# Patient Record
Sex: MALE | Race: WHITE | NOT HISPANIC OR LATINO | Employment: PART TIME | URBAN - METROPOLITAN AREA
[De-identification: names, ages, dates, MRNs, and addresses within clinical notes are randomized per-mention and may not be internally consistent; named-entity substitution may affect disease eponyms.]

---

## 2020-01-04 ENCOUNTER — APPOINTMENT (OUTPATIENT)
Dept: GENERAL RADIOLOGY | Facility: HOSPITAL | Age: 25
End: 2020-01-04

## 2020-01-04 PROCEDURE — 70160 X-RAY EXAM OF NASAL BONES: CPT | Performed by: FAMILY MEDICINE

## 2022-04-08 ENCOUNTER — TELEPHONE (OUTPATIENT)
Dept: FAMILY MEDICINE CLINIC | Facility: CLINIC | Age: 27
End: 2022-04-08

## 2022-04-08 NOTE — TELEPHONE ENCOUNTER
Caller: Lizandro Newsome    Relationship to patient: Self    Best call back number: [phone]    Chief complaint: NEW PATIENT,HRT    Type of visit: NEW PATIENT    Requested date: EARLY APPOINTMENT    If rescheduling, when is the original appointment: 8/23    Additional notes: PATIENT CALLED IN TO SCHEDULE A NEW PATIENT APPOINTMENT WITH , HUB SCHEDULED FOR 8/23 BUT PATIENT WOULD LIKE TO SEE IF THERE ARE ANY EARLIER APPOINTMENTS. PATIENT NEEDS TO BE SEEN SOONER FOR HORMONE REPLACEMENT THERAPY.    PATIENT WOULD LIKE TO ADVISE  THIS IS A TRANS-HEALTH APPOINTMENT

## 2022-04-25 ENCOUNTER — OFFICE VISIT (OUTPATIENT)
Dept: FAMILY MEDICINE CLINIC | Facility: CLINIC | Age: 27
End: 2022-04-25

## 2022-04-25 VITALS
HEART RATE: 104 BPM | RESPIRATION RATE: 19 BRPM | OXYGEN SATURATION: 98 % | SYSTOLIC BLOOD PRESSURE: 115 MMHG | BODY MASS INDEX: 20.04 KG/M2 | HEIGHT: 70 IN | WEIGHT: 140 LBS | DIASTOLIC BLOOD PRESSURE: 70 MMHG

## 2022-04-25 DIAGNOSIS — F64.0 GENDER DYSPHORIA IN ADULT: Primary | ICD-10-CM

## 2022-04-25 DIAGNOSIS — E34.9 HORMONE IMBALANCE: ICD-10-CM

## 2022-04-25 PROBLEM — K21.9 GASTROESOPHAGEAL REFLUX DISEASE WITHOUT ESOPHAGITIS: Status: ACTIVE | Noted: 2017-09-13

## 2022-04-25 PROCEDURE — 99213 OFFICE O/P EST LOW 20 MIN: CPT | Performed by: FAMILY MEDICINE

## 2022-04-25 RX ORDER — ESTRADIOL VALERATE 20 MG/ML
5 INJECTION INTRAMUSCULAR WEEKLY
Qty: 5 ML | Refills: 3 | Status: SHIPPED | OUTPATIENT
Start: 2022-04-25 | End: 2022-06-14

## 2022-04-25 NOTE — PATIENT INSTRUCTIONS
[phone]    Transfemme Resources    [facility] Transgender Support Group - Facebook group    [employer] Association for Transgender Health, Standards of Care  https://www.wpath.org/publications/soc - pages 38 and 40 especially     [facility], Transgender Health - [email or web address]      Deborah Jimenez - [email or web address]    [facility] - [email or web address]     Parents, Families, and Friends of Lesbians and [place] - https://www.pflag.org/ourtranslovedones  - good document for friends/family who need to learn the basics     [facility] [place] - https://transequality.org/documents  - helps with name change, gender marker change, etc, is state specific     * * * * * *    Insurance will typically cover hormones, but if they don't, or if you need to pay out of pocket, use the following site.    www.[employer]     1cc, Luer-Roxi Tip  Syringes      18 G x 1.5” hypodermic needles for drawing up      25 G x 5/8” hypodermic needles for injecting      [employer] Trans Health Injection Guide -   https://[employer].org/wp-content/uploads/MG-6_TransHealth_InjectionGuide.pdf     Injection supplies:  Syringes   Needles   Alcohol swabs  Cotton balls/band-aids  Sharps container

## 2022-04-25 NOTE — PROGRESS NOTES
"Chief Complaint  Establish Care and Gender affirming hormone therapy     Subjective    History of Present Illness {CC  Problem List  Visit  Diagnosis   Encounters  Notes  Medications  Labs  Result Review Imaging  Media :23}     Lizandro Newsome presents to [facility] for Establish Care and Gender affirming hormone therapy .  History of Present Illness     Here today to establish care and discuss gender-affirming hormone therapy. Has been wrestling with gender identity during the pandemic. Began questioning their own gender and came out as bisexual and non-binary. Started presenting more feminine and found that it was pleasing. Feels that over the past couple years has become more comfortable as female, and more so less comfortable being thought of as male. Has done some general reading about hormonal transition, is interested in starting today.    Diagnosed with ADHD and some anxiety and depression. Sees a psychiatric provider and is currently on Adderall, Vyvanse, and some mirtazapine. Reports good adherence and tolerance to current regimen.    Objective     Vital Signs:   /70   Pulse 104   Resp 19   Ht 177.8 cm (70\")   Wt 63.5 kg (140 lb)   SpO2 98%   BMI 20.09 kg/m²   Physical Exam  Vitals and nursing note reviewed.   Constitutional:       General: He is not in acute distress.     Appearance: Normal appearance. He is not ill-appearing.   Cardiovascular:      Rate and Rhythm: Normal rate and regular rhythm.      Pulses: Normal pulses.      Heart sounds: Normal heart sounds. No murmur heard.  Pulmonary:      Effort: Pulmonary effort is normal. No respiratory distress.      Breath sounds: Normal breath sounds. No rales.   Neurological:      Mental Status: He is alert and oriented to person, place, and time. Mental status is at baseline.   Psychiatric:         Mood and Affect: Mood normal.         Behavior: Behavior normal.          Result Review  Data Reviewed:{ Labs  " Result Review  Imaging  Med Tab  Media :23}                   Assessment and Plan {CC Problem List  Visit Diagnosis  ROS  Review (Popup)  Health Maintenance  Quality  BestPractice  Medications  SmartSets  SnapShot Encounters  Media :23}   Diagnoses and all orders for this visit:    1. Gender dysphoria in adult (Primary)  -     estradiol valerate (DELESTROGEN) 20 MG/ML injection; Inject 0.25 mL into the appropriate muscle as directed by prescriber 1 (One) Time Per Week.  Dispense: 5 mL; Refill: 3    2. Hormone imbalance  -     estradiol valerate (DELESTROGEN) 20 MG/ML injection; Inject 0.25 mL into the appropriate muscle as directed by prescriber 1 (One) Time Per Week.  Dispense: 5 mL; Refill: 3    Established gender dysphoria, hormonal imbalance in a transgender patient with a strong desire for gender affirming hormone therapy.     Gender identity is consistent, persistent, and insistent. Discussed risks, benefits, alternatives, potential side effects of therapy, and typical follow up. Resources provided including [facility] Standards of Care, PFLAG “Our Trans Loved Ones”, and local support groups.     Meds as above. Gave detailed instructions as to proper subcutaneous injection technique and demonstrated the same. Given a bag of sample injection supplies to last until they are able to obtain their own supply. F/u with a phone call/message in 2-3 weeks, and an office visit in 2 months. Will order labs at f/u. Encouraged to sign up for and use [employer].    >50% of this 30 min visit spent in counseling about gender-affirming hormone therapy.      Follow Up {Instructions Charge Capture  Follow-up Communications :23}     Patient was given instructions and counseling regarding his condition or for health maintenance advice. Please see specific information pulled into the AVS (placed there by myself) if appropriate.    Return in about 3 months (around 7/25/2022), or if symptoms worsen or fail to improve, for  F/u gender-affirming hormone therapy.      NAN Montiel MD

## 2022-04-28 RX ORDER — ESTRADIOL VALERATE 40 MG/ML
5 INJECTION INTRAMUSCULAR WEEKLY
Qty: 5 ML | Refills: 2 | Status: SHIPPED | OUTPATIENT
Start: 2022-04-28

## 2022-04-29 ENCOUNTER — PATIENT ROUNDING (BHMG ONLY) (OUTPATIENT)
Dept: FAMILY MEDICINE CLINIC | Facility: CLINIC | Age: 27
End: 2022-04-29

## 2022-04-29 NOTE — PROGRESS NOTES
April 29, 2022    Hello, may I speak with Lizandro Newsome?    My name is Luly     I am  with [facility]  [address]  [facility] [address] [phone].    Before we get started may I verify your date of birth? 1995    I am calling to officially welcome you to our practice and ask about your recent visit. Is this a good time to talk? yes    Tell me about your visit with us. What things went well? Pt stated that staff were all really nice and friendly to them. They also stated that they liked how Dr. Montiel was very knowledgeable but also laid back.        We're always looking for ways to make our patients' experiences even better. Do you have recommendations on ways we may improve?  no    Overall were you satisfied with your first visit to our practice? yes       I appreciate you taking the time to speak with me today. Is there anything else I can do for you? no      Thank you, and have a great day.

## 2022-06-14 ENCOUNTER — OFFICE VISIT (OUTPATIENT)
Dept: FAMILY MEDICINE CLINIC | Facility: CLINIC | Age: 27
End: 2022-06-14

## 2022-06-14 VITALS
BODY MASS INDEX: 20.23 KG/M2 | OXYGEN SATURATION: 98 % | HEART RATE: 82 BPM | DIASTOLIC BLOOD PRESSURE: 68 MMHG | WEIGHT: 141 LBS | RESPIRATION RATE: 18 BRPM | SYSTOLIC BLOOD PRESSURE: 126 MMHG

## 2022-06-14 DIAGNOSIS — N52.8 OTHER MALE ERECTILE DYSFUNCTION: Primary | ICD-10-CM

## 2022-06-14 PROCEDURE — 99214 OFFICE O/P EST MOD 30 MIN: CPT | Performed by: FAMILY MEDICINE

## 2022-06-14 RX ORDER — SILDENAFIL CITRATE 20 MG/1
20 TABLET ORAL DAILY PRN
Qty: 30 TABLET | Refills: 2 | Status: SHIPPED | OUTPATIENT
Start: 2022-06-14 | End: 2022-08-17

## 2022-06-14 NOTE — PROGRESS NOTES
Chief Complaint  Hormonal Imbalance in Transgender Patient and Libido Issues (Since starting estrogen x1 month ago)    Subjective    History of Present Illness {CC  Problem List  Visit  Diagnosis   Encounters  Notes  Medications  Labs  Result Review Imaging  Media :23}     Lizandro Newsome presents to [facility] for Hormonal Imbalance in Transgender Patient and Libido Issues (Since starting estrogen x1 month ago).  History of Present Illness     Here today for follow-up as above. Has been on estradiol for about a month now, very happy with the changes overall. Has noticed some decreased libido. Vyvanse has already caused some difficulty with erectile dysfunction. Estradiol has augmented this. Is able to be active if they miss a dose of Vyvanse but otherwise finds it very difficult. Has some questions about possibly trying sildenafil in the interim.    Objective     Vital Signs:   /68   Pulse 82   Resp 18   Wt 64 kg (141 lb)   SpO2 98%   BMI 20.23 kg/m²   Physical Exam  Vitals and nursing note reviewed.   Constitutional:       General: He is not in acute distress.     Appearance: Normal appearance. He is not ill-appearing.   Cardiovascular:      Rate and Rhythm: Normal rate and regular rhythm.      Pulses: Normal pulses.      Heart sounds: Normal heart sounds. No murmur heard.  Pulmonary:      Effort: Pulmonary effort is normal. No respiratory distress.      Breath sounds: Normal breath sounds. No rales.   Neurological:      Mental Status: He is alert and oriented to person, place, and time. Mental status is at baseline.   Psychiatric:         Mood and Affect: Mood normal.         Behavior: Behavior normal.          Result Review  Data Reviewed:{ Labs  Result Review  Imaging  Med Tab  Media :23}                   Assessment and Plan {CC Problem List  Visit Diagnosis  ROS  Review (Popup)  Health Maintenance  Quality  BestPractice  Medications  SmartSets   SnapShot Encounters  Media :23}   Diagnoses and all orders for this visit:    1. Other male erectile dysfunction (Primary)  -     sildenafil (REVATIO) 20 MG tablet; Take 1 tablet by mouth Daily As Needed (sexual activity). May take up to 3 tabs at a time.  Dispense: 30 tablet; Refill: 2    Long discussion about libido and how it interacts with various hormones. We will try some sildenafil as above however also challenged them to further investigate their evolving libido.    Recommended follow-up as previously scheduled to check hormone levels. Encouraged communication via [facility] in the meantime.    I spent 30 minutes face-to-face with patient, reviewing chart, coordinating care, and documenting in the chart.      Patient was given instructions and counseling regarding his condition or for health maintenance advice. Please see specific information pulled into the AVS (placed there by myself) if appropriate.    Return in about 2 months (around 8/14/2022), or if symptoms worsen or fail to improve.      NAN Montiel MD

## 2022-08-05 ENCOUNTER — TELEPHONE (OUTPATIENT)
Dept: FAMILY MEDICINE CLINIC | Facility: CLINIC | Age: 27
End: 2022-08-05

## 2022-08-05 NOTE — TELEPHONE ENCOUNTER
Caller: Lizandro Newsome    Relationship: Self    Best call back number: [phone] (H)      PATIENT IS CURIOUS WHERE HE CAN GO FOR A MONKEYPOX VACCINATION AND IS THERE ONE ?    PLEASE ADVISE

## 2022-08-09 ENCOUNTER — CLINICAL SUPPORT (OUTPATIENT)
Dept: FAMILY MEDICINE CLINIC | Facility: CLINIC | Age: 27
End: 2022-08-09

## 2022-08-09 DIAGNOSIS — Z23 NEED FOR VACCINATION: Primary | ICD-10-CM

## 2022-08-09 PROCEDURE — 90611 SMALLPOX&MONKEYPOX VAC 0.5ML: CPT | Performed by: FAMILY MEDICINE

## 2022-08-09 PROCEDURE — 90471 IMMUNIZATION ADMIN: CPT | Performed by: FAMILY MEDICINE

## 2022-08-17 ENCOUNTER — OFFICE VISIT (OUTPATIENT)
Dept: FAMILY MEDICINE CLINIC | Facility: CLINIC | Age: 27
End: 2022-08-17

## 2022-08-17 VITALS
RESPIRATION RATE: 18 BRPM | OXYGEN SATURATION: 100 % | SYSTOLIC BLOOD PRESSURE: 116 MMHG | DIASTOLIC BLOOD PRESSURE: 72 MMHG | WEIGHT: 137 LBS | BODY MASS INDEX: 19.66 KG/M2 | HEART RATE: 76 BPM

## 2022-08-17 DIAGNOSIS — E34.9 HORMONE IMBALANCE: ICD-10-CM

## 2022-08-17 DIAGNOSIS — Z51.81 THERAPEUTIC DRUG MONITORING: ICD-10-CM

## 2022-08-17 DIAGNOSIS — F64.0 GENDER DYSPHORIA IN ADULT: Primary | ICD-10-CM

## 2022-08-17 PROCEDURE — 99213 OFFICE O/P EST LOW 20 MIN: CPT | Performed by: FAMILY MEDICINE

## 2022-08-17 NOTE — PROGRESS NOTES
Chief Complaint  Hormonal Imbalance in Transgender Patient     Subjective    History of Present Illness {CC  Problem List  Visit  Diagnosis   Encounters  Notes  Medications  Labs  Result Review Imaging  Media :23}     Lizandro Newsome presents to [facility] for Hormonal Imbalance in Transgender Patient .  History of Present Illness     Here today for follow-up as above. Has been doing quite well with estradiol injections for the past 3 months or so. Very happy with the results thus far. Has noticed a fair amount of emotional changes as well as some physical changes. Nothing unwanted. Has been surprised by how much she has welcomed breast growth. Tolerating self administration without issue, no injection site reactions. Due for check of labs today.    Continues on Vyvanse and Adderall prescribed by mental health provider.    Objective     Vital Signs:   /72   Pulse 76   Resp 18   Wt 62.1 kg (137 lb)   SpO2 100%   BMI 19.66 kg/m²   Physical Exam  Vitals and nursing note reviewed.   Constitutional:       General: He is not in acute distress.     Appearance: Normal appearance. He is not ill-appearing.   Cardiovascular:      Rate and Rhythm: Normal rate and regular rhythm.      Pulses: Normal pulses.      Heart sounds: Normal heart sounds. No murmur heard.  Pulmonary:      Effort: Pulmonary effort is normal. No respiratory distress.      Breath sounds: Normal breath sounds. No rales.   Neurological:      Mental Status: He is alert and oriented to person, place, and time. Mental status is at baseline.   Psychiatric:         Mood and Affect: Mood normal.         Behavior: Behavior normal.          Result Review  Data Reviewed:{ Labs  Result Review  Imaging  Med Tab  Media :23}                   Assessment and Plan {CC Problem List  Visit Diagnosis  ROS  Review (Popup)  Health Maintenance  Quality  BestPractice  Medications  SmartSets  SnapShot Encounters  Media  :23}   Diagnoses and all orders for this visit:    1. Gender dysphoria in adult (Primary)  -     Estradiol  -     Estrone  -     Testosterone  -     Lipid Panel With LDL / HDL Ratio  -     Comprehensive Metabolic Panel    2. Hormone imbalance  -     Estradiol  -     Estrone  -     Testosterone  -     Lipid Panel With LDL / HDL Ratio  -     Comprehensive Metabolic Panel    3. Therapeutic drug monitoring  -     Estradiol  -     Estrone  -     Testosterone  -     Lipid Panel With LDL / HDL Ratio  -     Comprehensive Metabolic Panel    Orders as above. I will contact them with results as available. Continue regimen as prescribed. We will discuss any necessary changes as needed.    Recommended follow-up as below. Encouraged communication via [employer] in the meantime.    Patient was given instructions and counseling regarding his condition or for health maintenance advice. Please see specific information pulled into the AVS (placed there by myself) if appropriate.    Return in about 3 months (around 11/17/2022), or if symptoms worsen or fail to improve.      NAN Montiel MD

## 2022-08-18 LAB
ALBUMIN SERPL-MCNC: 4.4 G/DL (ref 4.1–5.2)
ALBUMIN/GLOB SERPL: 2 {RATIO} (ref 1.2–2.2)
ALP SERPL-CCNC: 73 IU/L (ref 44–121)
ALT SERPL-CCNC: 22 IU/L (ref 0–44)
AST SERPL-CCNC: 21 IU/L (ref 0–40)
BILIRUB SERPL-MCNC: 0.2 MG/DL (ref 0–1.2)
BUN SERPL-MCNC: 19 MG/DL (ref 6–20)
BUN/CREAT SERPL: 20 (ref 9–20)
CALCIUM SERPL-MCNC: 9.1 MG/DL (ref 8.7–10.2)
CHLORIDE SERPL-SCNC: 103 MMOL/L (ref 96–106)
CHOLEST SERPL-MCNC: 141 MG/DL (ref 100–199)
CO2 SERPL-SCNC: 21 MMOL/L (ref 20–29)
CREAT SERPL-MCNC: 0.95 MG/DL (ref 0.76–1.27)
EGFRCR-CYS SERPLBLD CKD-EPI 2021: 114 ML/MIN/1.73
ESTRADIOL SERPL-MCNC: 281 PG/ML (ref 7.6–42.6)
GLOBULIN SER CALC-MCNC: 2.2 G/DL (ref 1.5–4.5)
GLUCOSE SERPL-MCNC: 84 MG/DL (ref 65–99)
HDLC SERPL-MCNC: 58 MG/DL
LDLC SERPL CALC-MCNC: 71 MG/DL (ref 0–99)
LDLC/HDLC SERPL: 1.2 RATIO (ref 0–3.6)
POTASSIUM SERPL-SCNC: 4.4 MMOL/L (ref 3.5–5.2)
PROT SERPL-MCNC: 6.6 G/DL (ref 6–8.5)
SODIUM SERPL-SCNC: 140 MMOL/L (ref 134–144)
TESTOST SERPL-MCNC: 14 NG/DL (ref 264–916)
TRIGL SERPL-MCNC: 54 MG/DL (ref 0–149)
VLDLC SERPL CALC-MCNC: 12 MG/DL (ref 5–40)

## 2022-08-19 LAB — ESTRONE SERPL-MCNC: 138 PG/ML (ref 0–174)

## 2022-09-07 ENCOUNTER — CLINICAL SUPPORT (OUTPATIENT)
Dept: FAMILY MEDICINE CLINIC | Facility: CLINIC | Age: 27
End: 2022-09-07

## 2022-09-07 DIAGNOSIS — Z23 NEED FOR VACCINATION: Primary | ICD-10-CM

## 2022-09-07 PROCEDURE — 90471 IMMUNIZATION ADMIN: CPT | Performed by: FAMILY MEDICINE

## 2022-09-07 PROCEDURE — 90611 SMALLPOX&MONKEYPOX VAC 0.5ML: CPT | Performed by: FAMILY MEDICINE

## 2022-11-10 ENCOUNTER — TELEPHONE (OUTPATIENT)
Dept: FAMILY MEDICINE CLINIC | Facility: CLINIC | Age: 27
End: 2022-11-10

## 2022-11-10 NOTE — TELEPHONE ENCOUNTER
Caller: MercedezLizandro    Relationship: Self    Best call back number: [phone]    What medication are you requesting: NEEDLES AND SYRINGES FOR ESTRADIOL    Have you had these symptoms before:    [x] Yes  [] No    Have you been treated for these symptoms before:   [x] Yes  [] No    If a prescription is needed, what is your preferred pharmacy and phone number: [facility] [id number] - [place] [address] AT [facility] [address] ([facility])  [phone] [facility] [phone] FX     Additional notes: PHARMACY TOLD PATIENT THEY CAN NOT BUY THESE OVER THE COUNTER ANYMORE. PLEASE CALL PATIENT TO ADVISE WHEN SENT TO PHARMACY.

## 2022-11-17 ENCOUNTER — OFFICE VISIT (OUTPATIENT)
Dept: FAMILY MEDICINE CLINIC | Facility: CLINIC | Age: 27
End: 2022-11-17

## 2022-11-17 VITALS
BODY MASS INDEX: 20.09 KG/M2 | HEART RATE: 90 BPM | SYSTOLIC BLOOD PRESSURE: 116 MMHG | DIASTOLIC BLOOD PRESSURE: 68 MMHG | RESPIRATION RATE: 18 BRPM | WEIGHT: 140 LBS | OXYGEN SATURATION: 100 %

## 2022-11-17 DIAGNOSIS — Z51.81 THERAPEUTIC DRUG MONITORING: ICD-10-CM

## 2022-11-17 DIAGNOSIS — E34.9 HORMONE IMBALANCE: ICD-10-CM

## 2022-11-17 DIAGNOSIS — Z23 NEED FOR VACCINATION: ICD-10-CM

## 2022-11-17 DIAGNOSIS — F64.0 GENDER DYSPHORIA IN ADULT: Primary | ICD-10-CM

## 2022-11-17 LAB
ALBUMIN SERPL-MCNC: 4.6 G/DL (ref 3.5–5.2)
ALBUMIN/GLOB SERPL: 2.7 G/DL
ALP SERPL-CCNC: 67 U/L (ref 39–117)
ALT SERPL-CCNC: 29 U/L (ref 1–41)
AST SERPL-CCNC: 24 U/L (ref 1–40)
BILIRUB SERPL-MCNC: 0.3 MG/DL (ref 0–1.2)
BUN SERPL-MCNC: 15 MG/DL (ref 6–20)
BUN/CREAT SERPL: 18.8 (ref 7–25)
CALCIUM SERPL-MCNC: 9.2 MG/DL (ref 8.6–10.5)
CHLORIDE SERPL-SCNC: 105 MMOL/L (ref 98–107)
CHOLEST SERPL-MCNC: 138 MG/DL (ref 0–200)
CO2 SERPL-SCNC: 26.4 MMOL/L (ref 22–29)
CREAT SERPL-MCNC: 0.8 MG/DL (ref 0.76–1.27)
EGFRCR SERPLBLD CKD-EPI 2021: 126 ML/MIN/1.73
GLOBULIN SER CALC-MCNC: 1.7 GM/DL
GLUCOSE SERPL-MCNC: 93 MG/DL (ref 65–99)
HDLC SERPL-MCNC: 65 MG/DL (ref 40–60)
LDLC SERPL CALC-MCNC: 57 MG/DL (ref 0–100)
LDLC/HDLC SERPL: 0.87 {RATIO}
POTASSIUM SERPL-SCNC: 4.3 MMOL/L (ref 3.5–5.2)
PROT SERPL-MCNC: 6.3 G/DL (ref 6–8.5)
SODIUM SERPL-SCNC: 142 MMOL/L (ref 136–145)
TRIGL SERPL-MCNC: 82 MG/DL (ref 0–150)
VLDLC SERPL CALC-MCNC: 16 MG/DL (ref 5–40)

## 2022-11-17 PROCEDURE — 99213 OFFICE O/P EST LOW 20 MIN: CPT | Performed by: FAMILY MEDICINE

## 2022-11-17 PROCEDURE — 90471 IMMUNIZATION ADMIN: CPT | Performed by: FAMILY MEDICINE

## 2022-11-17 PROCEDURE — 91312 COVID-19 (PFIZER) BIVALENT BOOSTER 12+YRS: CPT | Performed by: FAMILY MEDICINE

## 2022-11-17 PROCEDURE — 90686 IIV4 VACC NO PRSV 0.5 ML IM: CPT | Performed by: FAMILY MEDICINE

## 2022-11-17 PROCEDURE — 0124A COVID-19 (PFIZER) BIVALENT BOOSTER 12+YRS: CPT | Performed by: FAMILY MEDICINE

## 2022-11-17 NOTE — PROGRESS NOTES
Chief Complaint  Gender Dysphoria in Adult    Subjective    History of Present Illness {CC  Problem List  Visit  Diagnosis   Encounters  Notes  Medications  Labs  Result Review Imaging  Media :23}     Lizandro Newsome presents to [facility] for Gender Dysphoria in Adult.  History of Present Illness     Here today for follow-up as above. Has been doing quite well on estradiol, very happy with results. No problems with self abdomen, no injection site reactions. No unwanted effects. Still having fairly consistent breast growth. Due for check of labs today.    Due for flu shot and COVID booster.    Objective     Vital Signs:   /68   Pulse 90   Resp 18   Wt 63.5 kg (140 lb)   SpO2 100%   BMI 20.09 kg/m²   Physical Exam  Vitals and nursing note reviewed.   Constitutional:       General: He is not in acute distress.     Appearance: Normal appearance. He is not ill-appearing.   Cardiovascular:      Rate and Rhythm: Normal rate and regular rhythm.      Pulses: Normal pulses.      Heart sounds: Normal heart sounds. No murmur heard.  Pulmonary:      Effort: Pulmonary effort is normal. No respiratory distress.      Breath sounds: Normal breath sounds. No rales.   Neurological:      Mental Status: He is alert and oriented to person, place, and time. Mental status is at baseline.   Psychiatric:         Mood and Affect: Mood normal.         Behavior: Behavior normal.          Result Review  Data Reviewed:{ Labs  Result Review  Imaging  Med Tab  Media :23}                   Assessment and Plan {CC Problem List  Visit Diagnosis  ROS  Review (Popup)  Health Maintenance  Quality  BestPractice  Medications  SmartSets  SnapShot Encounters  Media :23}   Diagnoses and all orders for this visit:    1. Gender dysphoria in adult (Primary)  -     Estradiol  -     Estrone  -     Testosterone  -     Lipid Panel With LDL / HDL Ratio  -     Comprehensive Metabolic Panel    2. Hormone  imbalance  -     Estradiol  -     Estrone  -     Testosterone  -     Lipid Panel With LDL / HDL Ratio  -     Comprehensive Metabolic Panel    3. Therapeutic drug monitoring  -     Estradiol  -     Estrone  -     Testosterone  -     Lipid Panel With LDL / HDL Ratio  -     Comprehensive Metabolic Panel    4. Need for vaccination  -     FluLaval/Fluzone >6 mos (4180-6301)  -     COVID-19 Bivalent Booster (Pfizer) 12+yrs    Orders as above. I will contact her with results as available. Vaccines as requested.    Recommended follow-up as below. Encouraged communication via [employer] in the meantime.    Patient was given instructions and counseling regarding his condition or for health maintenance advice. Please see specific information pulled into the AVS (placed there by myself) if appropriate.    Return in about 3 months (around 2/17/2023) for f/u gender-affirming hormone therapy.      NAN Montiel MD

## 2022-11-18 LAB
ESTRADIOL SERPL-MCNC: 277 PG/ML (ref 7.6–42.6)
TESTOST SERPL-MCNC: 7 NG/DL (ref 264–916)

## 2022-11-21 LAB — ESTRONE SERPL-MCNC: 158 PG/ML (ref 0–174)

## 2024-02-15 ENCOUNTER — OFFICE VISIT (OUTPATIENT)
Dept: FAMILY MEDICINE CLINIC | Facility: CLINIC | Age: 29
End: 2024-02-15
Payer: COMMERCIAL

## 2024-02-15 VITALS
HEART RATE: 107 BPM | OXYGEN SATURATION: 97 % | HEIGHT: 70 IN | RESPIRATION RATE: 18 BRPM | BODY MASS INDEX: 21.47 KG/M2 | WEIGHT: 150 LBS | DIASTOLIC BLOOD PRESSURE: 80 MMHG | SYSTOLIC BLOOD PRESSURE: 118 MMHG

## 2024-02-15 DIAGNOSIS — E34.9 HORMONE IMBALANCE: ICD-10-CM

## 2024-02-15 DIAGNOSIS — F64.0 GENDER DYSPHORIA IN ADULT: Primary | ICD-10-CM

## 2024-02-15 PROCEDURE — 99214 OFFICE O/P EST MOD 30 MIN: CPT | Performed by: FAMILY MEDICINE

## 2024-02-15 RX ORDER — ESTRADIOL VALERATE 20 MG/ML
2 INJECTION INTRAMUSCULAR 2 TIMES WEEKLY
Qty: 5 ML | Refills: 3 | Status: SHIPPED | OUTPATIENT
Start: 2024-02-15

## 2024-04-11 DIAGNOSIS — E34.9 HORMONE IMBALANCE: ICD-10-CM

## 2024-04-11 DIAGNOSIS — F64.0 GENDER DYSPHORIA IN ADULT: ICD-10-CM

## 2024-04-11 RX ORDER — ESTRADIOL VALERATE 20 MG/ML
2 INJECTION INTRAMUSCULAR 2 TIMES WEEKLY
Qty: 5 ML | Refills: 3 | Status: SHIPPED | OUTPATIENT
Start: 2024-04-11

## 2024-05-15 ENCOUNTER — OFFICE VISIT (OUTPATIENT)
Dept: FAMILY MEDICINE CLINIC | Facility: CLINIC | Age: 29
End: 2024-05-15
Payer: COMMERCIAL

## 2024-05-15 VITALS
BODY MASS INDEX: 20.94 KG/M2 | OXYGEN SATURATION: 100 % | DIASTOLIC BLOOD PRESSURE: 80 MMHG | HEIGHT: 70 IN | RESPIRATION RATE: 18 BRPM | SYSTOLIC BLOOD PRESSURE: 120 MMHG | HEART RATE: 86 BPM | WEIGHT: 146.3 LBS

## 2024-05-15 DIAGNOSIS — F64.0 GENDER DYSPHORIA IN ADULT: Primary | ICD-10-CM

## 2024-05-15 DIAGNOSIS — E34.9 HORMONE IMBALANCE: ICD-10-CM

## 2024-05-15 DIAGNOSIS — H93.A2 PULSATILE TINNITUS OF LEFT EAR: ICD-10-CM

## 2024-05-15 DIAGNOSIS — Z51.81 THERAPEUTIC DRUG MONITORING: ICD-10-CM

## 2024-05-15 PROCEDURE — 99214 OFFICE O/P EST MOD 30 MIN: CPT | Performed by: FAMILY MEDICINE

## 2024-05-15 RX ORDER — DEXTROAMPHETAMINE SACCHARATE, AMPHETAMINE ASPARTATE MONOHYDRATE, DEXTROAMPHETAMINE SULFATE AND AMPHETAMINE SULFATE 7.5; 7.5; 7.5; 7.5 MG/1; MG/1; MG/1; MG/1
30 CAPSULE, EXTENDED RELEASE ORAL EVERY MORNING
COMMUNITY
Start: 2024-04-12

## 2024-05-15 RX ORDER — ESTRADIOL VALERATE 20 MG/ML
2 INJECTION INTRAMUSCULAR 2 TIMES WEEKLY
Qty: 5 ML | Refills: 3 | Status: SHIPPED | OUTPATIENT
Start: 2024-05-16

## 2024-05-15 NOTE — PROGRESS NOTES
"Chief Complaint  gender dysphoria in adult     Subjective    History of Present Illness    Lizandro Newsome presents to [facility] for gender dysphoria in adult .  History of Present Illness     Here today for follow-up as above. Has been doing well overall, continues on regimen as prescribed. Very happy with the changes that they have seen thus far. No unwanted changes. No problems with self administration, no injection site reactions. Due for check of hormone labs as well as a refill of estradiol today.    Has been experiencing some pulsatile rushing noise that sinks up with their heartbeat in the left ear. Is rather constant, worse at night. No known triggers, no alleviating or exacerbating factors. Cannot recall any specific inciting event. Has not noticed any change in hearing otherwise.    Objective     Vital Signs:   /80   Pulse 86   Resp 18   Ht 177.8 cm (70\")   Wt 66.4 kg (146 lb 4.8 oz)   SpO2 100%   BMI 20.99 kg/m²   Physical Exam  Vitals and nursing note reviewed.   Constitutional:       General: E is not in acute distress.     Appearance: Normal appearance. E is not ill-appearing.   HENT:      Right Ear: Hearing, tympanic membrane, ear canal and external ear normal.      Left Ear: Hearing, tympanic membrane, ear canal and external ear normal.      Nose: No mucosal edema or congestion.      Mouth/Throat:      Lips: Pink.      Mouth: Mucous membranes are moist.      Pharynx: Oropharynx is clear.   Cardiovascular:      Rate and Rhythm: Normal rate and regular rhythm.      Pulses: Normal pulses.      Heart sounds: Normal heart sounds. No murmur heard.  Pulmonary:      Effort: Pulmonary effort is normal. No respiratory distress.      Breath sounds: Normal breath sounds. No rales.   Neurological:      Mental Status: E is alert and oriented to person, place, and time. Mental status is at baseline.   Psychiatric:         Mood and Affect: Mood normal.         Behavior: " Behavior normal.                 Assessment and Plan   Diagnoses and all orders for this visit:    1. Gender dysphoria in adult (Primary)  -     Estradiol  -     Estrone  -     Testosterone  -     Comprehensive Metabolic Panel  -     Progesterone  -     Lipid Panel  -     estradiol valerate (DELESTROGEN) 20 MG/ML injection; Inject 0.1 mL into the appropriate muscle as directed by prescriber 2 (Two) Times a Week.  Dispense: 5 mL; Refill: 3    2. Hormone imbalance  -     Estradiol  -     Estrone  -     Testosterone  -     Comprehensive Metabolic Panel  -     Progesterone  -     Lipid Panel  -     estradiol valerate (DELESTROGEN) 20 MG/ML injection; Inject 0.1 mL into the appropriate muscle as directed by prescriber 2 (Two) Times a Week.  Dispense: 5 mL; Refill: 3    3. Therapeutic drug monitoring  -     Estradiol  -     Estrone  -     Testosterone  -     Comprehensive Metabolic Panel  -     Progesterone  -     Lipid Panel    4. Pulsatile tinnitus of left ear  -     MRI Brain Without Contrast; Future    Orders as above. I will contact them with results as available. Continue regimen as prescribed. Refill as requested.    Discussed etiologies and general workup for pulsatile tinnitus. Imaging as above. I will contact them with results. We will discuss further workup as needed.    Recommended follow up as below. Encouraged communication via [employer] in the meantime.     Patient was given instructions and counseling regarding E's condition or for health maintenance advice. Please see specific information pulled into the AVS (placed there by myself) if appropriate.    Return in about 6 months (around 11/15/2024), or if symptoms worsen or fail to improve, for f/u gender-affirming hormone therapy.    NAN Montiel MD

## 2024-05-16 LAB
ALBUMIN SERPL-MCNC: 4.8 G/DL (ref 3.5–5.2)
ALBUMIN/GLOB SERPL: 2.1 G/DL
ALP SERPL-CCNC: 68 U/L (ref 39–117)
ALT SERPL-CCNC: 26 U/L (ref 1–41)
AST SERPL-CCNC: 22 U/L (ref 1–40)
BILIRUB SERPL-MCNC: 0.4 MG/DL (ref 0–1.2)
BUN SERPL-MCNC: 13 MG/DL (ref 6–20)
BUN/CREAT SERPL: 16.5 (ref 7–25)
CALCIUM SERPL-MCNC: 9.7 MG/DL (ref 8.6–10.5)
CHLORIDE SERPL-SCNC: 101 MMOL/L (ref 98–107)
CHOLEST SERPL-MCNC: 146 MG/DL (ref 0–200)
CO2 SERPL-SCNC: 25.3 MMOL/L (ref 22–29)
CREAT SERPL-MCNC: 0.79 MG/DL (ref 0.76–1.27)
EGFRCR SERPLBLD CKD-EPI 2021: 124.9 ML/MIN/1.73
ESTRADIOL SERPL-MCNC: 385 PG/ML (ref 7.6–42.6)
GLOBULIN SER CALC-MCNC: 2.3 GM/DL
GLUCOSE SERPL-MCNC: 70 MG/DL (ref 65–99)
HDLC SERPL-MCNC: 72 MG/DL (ref 40–60)
LDLC SERPL CALC-MCNC: 61 MG/DL (ref 0–100)
POTASSIUM SERPL-SCNC: 4.4 MMOL/L (ref 3.5–5.2)
PROGEST SERPL-MCNC: 0.1 NG/ML (ref 0–0.5)
PROT SERPL-MCNC: 7.1 G/DL (ref 6–8.5)
SODIUM SERPL-SCNC: 140 MMOL/L (ref 136–145)
TESTOST SERPL-MCNC: 15 NG/DL (ref 264–916)
TRIGL SERPL-MCNC: 63 MG/DL (ref 0–150)
VLDLC SERPL CALC-MCNC: 13 MG/DL (ref 5–40)

## 2024-05-17 LAB — ESTRONE SERPL-MCNC: 122 PG/ML (ref 0–174)

## 2024-06-14 DIAGNOSIS — H93.A2 PULSATILE TINNITUS OF LEFT EAR: Primary | ICD-10-CM

## 2024-06-24 DIAGNOSIS — R49.9 VOICE COMPLAINT: Primary | ICD-10-CM

## 2024-08-20 DIAGNOSIS — E34.9 HORMONE IMBALANCE: ICD-10-CM

## 2024-08-20 DIAGNOSIS — F64.0 GENDER DYSPHORIA IN ADULT: ICD-10-CM

## 2024-08-20 RX ORDER — ESTRADIOL VALERATE 20 MG/ML
2 INJECTION INTRAMUSCULAR 2 TIMES WEEKLY
Qty: 5 ML | Refills: 3 | Status: SHIPPED | OUTPATIENT
Start: 2024-08-22

## 2024-08-22 ENCOUNTER — OFFICE VISIT (OUTPATIENT)
Dept: FAMILY MEDICINE CLINIC | Facility: CLINIC | Age: 29
End: 2024-08-22
Payer: COMMERCIAL

## 2024-08-22 VITALS
RESPIRATION RATE: 14 BRPM | BODY MASS INDEX: 21.26 KG/M2 | SYSTOLIC BLOOD PRESSURE: 116 MMHG | HEART RATE: 79 BPM | OXYGEN SATURATION: 99 % | DIASTOLIC BLOOD PRESSURE: 78 MMHG | WEIGHT: 148.5 LBS | HEIGHT: 70 IN

## 2024-08-22 DIAGNOSIS — F64.0 GENDER DYSPHORIA IN ADULT: Primary | ICD-10-CM

## 2024-08-22 DIAGNOSIS — E34.9 HORMONE IMBALANCE: ICD-10-CM

## 2024-08-22 DIAGNOSIS — Z51.81 THERAPEUTIC DRUG MONITORING: ICD-10-CM

## 2024-08-22 RX ORDER — PROGESTERONE 100 MG/1
100 CAPSULE ORAL DAILY
Qty: 90 CAPSULE | Refills: 1 | Status: SHIPPED | OUTPATIENT
Start: 2024-08-22 | End: 2025-02-18

## 2024-08-22 NOTE — PROGRESS NOTES
"Chief Complaint  Gender dysphoria in adult    Subjective    History of Present Illness    Lizandro Newsome presents to [facility] for Gender dysphoria in adult.  History of Present Illness     Here today for follow-up as above. Continues on estradiol as prescribed, reports good adherence and tolerance and no unwanted side effects. No problems with self administration, no injection site reactions. Happy with all the changes that she has seen, no unwanted changes. Interested in starting progesterone, would prefer the oral route if possible. Due for check of levels, will come back prior to her next injection.    Objective     Vital Signs:   /78   Pulse 79   Resp 14   Ht 177.8 cm (70\")   Wt 67.4 kg (148 lb 8 oz)   SpO2 99%   BMI 21.31 kg/m²   Physical Exam  Vitals and nursing note reviewed.   Constitutional:       General: She is not in acute distress.     Appearance: Normal appearance. She is not ill-appearing.   Cardiovascular:      Rate and Rhythm: Normal rate and regular rhythm.      Pulses: Normal pulses.      Heart sounds: Normal heart sounds. No murmur heard.  Pulmonary:      Effort: Pulmonary effort is normal. No respiratory distress.      Breath sounds: Normal breath sounds. No rales.   Neurological:      Mental Status: She is alert and oriented to person, place, and time. Mental status is at baseline.   Psychiatric:         Mood and Affect: Mood normal.         Behavior: Behavior normal.          Assessment and Plan   Diagnoses and all orders for this visit:    1. Gender dysphoria in adult (Primary)  -     Estradiol  -     Estrone  -     Testosterone  -     Progesterone  -     Progesterone (PROMETRIUM) 100 MG capsule; Take 1 capsule by mouth Daily for 180 days.  Dispense: 90 capsule; Refill: 1    2. Hormone imbalance  -     Estradiol  -     Estrone  -     Testosterone  -     Progesterone  -     Progesterone (PROMETRIUM) 100 MG capsule; Take 1 capsule by mouth Daily for 180 " days.  Dispense: 90 capsule; Refill: 1    3. Therapeutic drug monitoring  -     Estradiol  -     Estrone  -     Testosterone  -     Progesterone    Orders as above. I will contact her with lab results as available. Progesterone as above.    Recommended follow up as below. Encouraged communication via [email or web address] in the meantime.     Patient was given instructions and counseling regarding her condition or for health maintenance advice. Please see specific information pulled into the AVS (placed there by myself) if appropriate.    Return in about 6 months (around 2/22/2025), or if symptoms worsen or fail to improve, for f/u gender-affirming hormone therapy.    NAN Montiel MD

## 2024-08-27 LAB
ESTRADIOL SERPL-MCNC: 233 PG/ML (ref 7.6–42.6)
PROGEST SERPL-MCNC: 1.8 NG/ML (ref 0–0.5)
TESTOST SERPL-MCNC: 10 NG/DL (ref 264–916)

## 2024-09-03 LAB — ESTRONE SERPL-MCNC: 161 PG/ML (ref 0–174)

## 2024-09-12 DIAGNOSIS — F64.0 GENDER DYSPHORIA IN ADULT: Primary | ICD-10-CM

## 2024-09-12 DIAGNOSIS — E34.9 HORMONE IMBALANCE: ICD-10-CM

## 2024-09-12 RX ORDER — PROGESTERONE 50 MG/ML
10 INJECTION, SOLUTION INTRAMUSCULAR WEEKLY
Qty: 10 ML | Refills: 1 | Status: SHIPPED | OUTPATIENT
Start: 2024-09-12

## 2024-10-28 ENCOUNTER — HOSPITAL ENCOUNTER (OUTPATIENT)
Dept: MRI IMAGING | Facility: HOSPITAL | Age: 29
Discharge: HOME | End: 2024-10-28
Admitting: FAMILY MEDICINE
Payer: COMMERCIAL

## 2024-10-28 DIAGNOSIS — H93.A2 PULSATILE TINNITUS OF LEFT EAR: ICD-10-CM

## 2024-10-28 PROCEDURE — 70553 MRI BRAIN STEM W/O & W/DYE: CPT

## 2024-10-28 PROCEDURE — 0 GADOBENATE DIMEGLUMINE 529 MG/ML SOLUTION: Performed by: FAMILY MEDICINE

## 2024-10-28 PROCEDURE — A9577 INJ MULTIHANCE: HCPCS | Performed by: FAMILY MEDICINE

## 2024-10-28 RX ADMIN — GADOBENATE DIMEGLUMINE 13 ML: 529 INJECTION, SOLUTION INTRAVENOUS at 15:08

## 2024-11-06 DIAGNOSIS — F64.0 GENDER DYSPHORIA IN ADULT: ICD-10-CM

## 2024-11-06 DIAGNOSIS — E34.9 HORMONE IMBALANCE: ICD-10-CM

## 2024-11-06 RX ORDER — ESTRADIOL VALERATE 20 MG/ML
2 INJECTION INTRAMUSCULAR 2 TIMES WEEKLY
Qty: 5 ML | Refills: 3 | Status: SHIPPED | OUTPATIENT
Start: 2024-11-07

## 2024-11-06 RX ORDER — PROGESTERONE 50 MG/ML
10 INJECTION, SOLUTION INTRAMUSCULAR WEEKLY
Qty: 10 ML | Refills: 1 | Status: SHIPPED | OUTPATIENT
Start: 2024-11-06

## 2024-11-25 ENCOUNTER — OFFICE VISIT (OUTPATIENT)
Dept: FAMILY MEDICINE CLINIC | Facility: CLINIC | Age: 29
End: 2024-11-25
Payer: COMMERCIAL

## 2024-11-25 VITALS
HEART RATE: 105 BPM | HEIGHT: 70 IN | BODY MASS INDEX: 21.11 KG/M2 | OXYGEN SATURATION: 98 % | WEIGHT: 147.5 LBS | RESPIRATION RATE: 16 BRPM

## 2024-11-25 DIAGNOSIS — E34.9 HORMONE IMBALANCE: ICD-10-CM

## 2024-11-25 DIAGNOSIS — Z12.5 PROSTATE CANCER SCREENING: ICD-10-CM

## 2024-11-25 DIAGNOSIS — Z23 NEED FOR VACCINATION: ICD-10-CM

## 2024-11-25 DIAGNOSIS — N40.0 ENLARGED PROSTATE: ICD-10-CM

## 2024-11-25 DIAGNOSIS — F64.0 GENDER DYSPHORIA IN ADULT: Primary | ICD-10-CM

## 2024-11-25 DIAGNOSIS — Z51.81 THERAPEUTIC DRUG MONITORING: ICD-10-CM

## 2024-11-25 PROCEDURE — 99214 OFFICE O/P EST MOD 30 MIN: CPT | Performed by: FAMILY MEDICINE

## 2024-11-25 RX ORDER — LISDEXAMFETAMINE DIMESYLATE 50 MG/1
50 CAPSULE ORAL EVERY MORNING
COMMUNITY
Start: 2024-11-05

## 2024-11-25 RX ORDER — ESTRADIOL VALERATE 20 MG/ML
2 INJECTION INTRAMUSCULAR 2 TIMES WEEKLY
Qty: 5 ML | Refills: 3 | Status: SHIPPED | OUTPATIENT
Start: 2024-11-25

## 2024-11-25 NOTE — PROGRESS NOTES
"Chief Complaint  Gender dysphoria in adult    Subjective    History of Present Illness    Lizandro Newsome presents to [facility] for Gender dysphoria in adult.  History of Present Illness     Here today for follow-up as above. Continues on regimen as prescribed, reports good adherence and tolerance. No unwanted side effects. No problems with self administration, no injection site reactions. Needs a refill of estradiol today.    Also asking for a PSA test as she was seen in urology clinic and told that she had an enlarged prostate. She believes this was via ultrasound while she was being examined for kidney stones. Very concerned about possibility of prostate cancer. Denies any signs or symptoms of enlarged prostate or prostate cancer.    Would like flu and COVID vaccines today.    Objective     Vital Signs:   Pulse 105   Resp 16   Ht 177.8 cm (70\")   Wt 66.9 kg (147 lb 8 oz)   SpO2 98%   BMI 21.16 kg/m²   Physical Exam  Vitals and nursing note reviewed.   Constitutional:       General: She is not in acute distress.     Appearance: Normal appearance. She is not ill-appearing.   Cardiovascular:      Rate and Rhythm: Normal rate and regular rhythm.      Pulses: Normal pulses.      Heart sounds: Normal heart sounds. No murmur heard.  Pulmonary:      Effort: Pulmonary effort is normal. No respiratory distress.      Breath sounds: Normal breath sounds. No rales.   Neurological:      Mental Status: She is alert and oriented to person, place, and time. Mental status is at baseline.   Psychiatric:         Mood and Affect: Mood normal.         Behavior: Behavior normal.                 Assessment and Plan   Diagnoses and all orders for this visit:    1. Gender dysphoria in adult (Primary)  -     Estradiol  -     Estrone  -     Testosterone  -     Comprehensive Metabolic Panel  -     Progesterone  -     Lipid Panel  -     estradiol valerate (DELESTROGEN) 20 MG/ML injection; Inject 0.1 mL into " the appropriate muscle as directed by prescriber 2 (Two) Times a Week. Please discard vial 28 days after opening/puncturing.  Dispense: 5 mL; Refill: 3    2. Hormone imbalance  -     Estradiol  -     Estrone  -     Testosterone  -     Comprehensive Metabolic Panel  -     Progesterone  -     Lipid Panel  -     estradiol valerate (DELESTROGEN) 20 MG/ML injection; Inject 0.1 mL into the appropriate muscle as directed by prescriber 2 (Two) Times a Week. Please discard vial 28 days after opening/puncturing.  Dispense: 5 mL; Refill: 3    3. Therapeutic drug monitoring  -     Estradiol  -     Estrone  -     Testosterone  -     Comprehensive Metabolic Panel  -     Progesterone  -     Lipid Panel    4. Enlarged prostate  -     PSA Screen    5. Prostate cancer screening  -     PSA Screen    6. Need for vaccination  -     Cancel: Fluzone >6mos ([id number])  -     Cancel: COVID-19 (Pfizer) 12yrs+ (COMIRNATY)    Orders as above. I will contact her with results as available. Continue regimen as prescribed.    Recommended follow up as below. Encouraged communication via [facility] in [place] in the meantime.     Patient was given instructions and counseling regarding her condition or for health maintenance advice. Please see specific information pulled into the AVS (placed there by myself) if appropriate.    Return in about 6 months (around 5/25/2025), or if symptoms worsen or fail to improve, for f/u gender-affirming hormone therapy.    NAN Montiel MD

## 2024-11-26 LAB
ALBUMIN SERPL-MCNC: 4.6 G/DL (ref 3.5–5.2)
ALBUMIN/GLOB SERPL: 2.2 G/DL
ALP SERPL-CCNC: 71 U/L (ref 39–117)
ALT SERPL-CCNC: 27 U/L (ref 1–41)
AST SERPL-CCNC: 22 U/L (ref 1–40)
BILIRUB SERPL-MCNC: 0.3 MG/DL (ref 0–1.2)
BUN SERPL-MCNC: 9 MG/DL (ref 6–20)
BUN/CREAT SERPL: 11 (ref 7–25)
CALCIUM SERPL-MCNC: 9.6 MG/DL (ref 8.6–10.5)
CHLORIDE SERPL-SCNC: 105 MMOL/L (ref 98–107)
CHOLEST SERPL-MCNC: 154 MG/DL (ref 0–200)
CO2 SERPL-SCNC: 23.4 MMOL/L (ref 22–29)
CREAT SERPL-MCNC: 0.82 MG/DL (ref 0.76–1.27)
EGFRCR SERPLBLD CKD-EPI 2021: 123.5 ML/MIN/1.73
ESTRADIOL SERPL-MCNC: 191 PG/ML (ref 7.6–42.6)
GLOBULIN SER CALC-MCNC: 2.1 GM/DL
GLUCOSE SERPL-MCNC: 95 MG/DL (ref 65–99)
HDLC SERPL-MCNC: 70 MG/DL (ref 40–60)
LDLC SERPL CALC-MCNC: 74 MG/DL (ref 0–100)
POTASSIUM SERPL-SCNC: 4.1 MMOL/L (ref 3.5–5.2)
PROGEST SERPL-MCNC: 0.2 NG/ML (ref 0–0.5)
PROT SERPL-MCNC: 6.7 G/DL (ref 6–8.5)
PSA SERPL-MCNC: 0.49 NG/ML (ref 0–4)
SODIUM SERPL-SCNC: 142 MMOL/L (ref 136–145)
TESTOST SERPL-MCNC: 20 NG/DL (ref 264–916)
TRIGL SERPL-MCNC: 43 MG/DL (ref 0–150)
VLDLC SERPL CALC-MCNC: 10 MG/DL (ref 5–40)

## 2024-11-27 LAB — ESTRONE SERPL-MCNC: 103 PG/ML (ref 0–174)
